# Patient Record
Sex: FEMALE | URBAN - METROPOLITAN AREA
[De-identification: names, ages, dates, MRNs, and addresses within clinical notes are randomized per-mention and may not be internally consistent; named-entity substitution may affect disease eponyms.]

---

## 2021-04-29 ENCOUNTER — OFFICE VISIT (OUTPATIENT)
Dept: SURGERY | Facility: CLINIC | Age: 46
End: 2021-04-29
Payer: COMMERCIAL

## 2021-04-29 DIAGNOSIS — D21.0 BENIGN NEOPLASM OF CONNECTIVE AND OTHER SOFT TISSUE OF HEAD, FACE AND NECK: Primary | ICD-10-CM

## 2021-04-29 PROCEDURE — 99204 OFFICE O/P NEW MOD 45 MIN: CPT | Performed by: PLASTIC SURGERY

## 2021-04-29 RX ORDER — HYDROCODONE BITARTRATE AND ACETAMINOPHEN 7.5; 325 MG/1; MG/1
1 TABLET ORAL
Qty: 10 TABLET | Refills: 0 | Status: SHIPPED | OUTPATIENT
Start: 2021-04-29

## 2021-04-29 NOTE — PROGRESS NOTES
Patient request for surgery signed by patient and witnessed and signed by RN. Prescription for Norco  and narcotic prescription electronically sent to pharmacy per Dr. Rin Brothers order and patient instructed to  prescription before surgery.    Pre

## 2021-04-29 NOTE — H&P
Ludwin Rodriguez is a 41year old female that presents with Patient presents with:  Cyst: forehead  .     REFERRED BY:  Misty Manuel    Pacemaker: No  Latex Allergy: no  Coumadin: No  Plavix: No  Other anticoagulants: No  Cardiac stents: No    HAND [facility] Normal  CARDIOVASCULAR: Regular rhythm, No murmurs  ABDOMEN: Inspection - Normal, No abdominal tenderness  NEURO: Memory intact  PSYCH: Oriented to person, place, time, and situation, Appropriate mood and affect      Integument Physical Exam:       Mid for

## 2021-12-18 ENCOUNTER — OFFICE VISIT (OUTPATIENT)
Dept: FAMILY MEDICINE CLINIC | Facility: CLINIC | Age: 46
End: 2021-12-18
Payer: COMMERCIAL

## 2021-12-18 VITALS — HEART RATE: 69 BPM

## 2021-12-18 DIAGNOSIS — Z20.822 EXPOSURE TO COVID-19 VIRUS: Primary | ICD-10-CM

## 2021-12-18 PROCEDURE — 99203 OFFICE O/P NEW LOW 30 MIN: CPT

## 2021-12-18 NOTE — PROGRESS NOTES
CHIEF COMPLAINT:   Patient presents with:  Covid-19 Test      HPI:   Leon Patrick is a 40year old female who presents for Covid 19 exposure a few days ago, from son.   At this time, is not experiencing upper respiratory symptoms, fever, or gastrointestin Sonia Schmidt is a 40year old female who presents with     ASSESSMENT: Exposure to covid-19 virus  (primary encounter diagnosis)    PLAN:   OTC Tylenol as needed. Reviewed symptom relief measures with patient if symptoms develop.      RT PCR - [facility] ordered patient indicates understanding of these issues and agrees to the plan.

## 2021-12-18 NOTE — PATIENT INSTRUCTIONS
• If you have any questions or concerns please contact our answering service at [phone] and we will return your phone call as soon as possible.    • Results for covid testing can vary from 1-2 days  • If you have not received results by 2 days please c

## 2023-11-16 ENCOUNTER — OFFICE VISIT (OUTPATIENT)
Dept: SURGERY | Facility: CLINIC | Age: 48
End: 2023-11-16

## 2023-11-16 DIAGNOSIS — D21.0 BENIGN NEOPLASM OF CONNECTIVE AND SOFT TISSUE OF HEAD: Primary | ICD-10-CM

## 2023-11-16 DIAGNOSIS — D21.0 BENIGN NEOPLASM OF CONNECTIVE AND OTHER SOFT TISSUE OF HEAD, FACE AND NECK: Primary | ICD-10-CM

## 2023-11-16 PROCEDURE — 99214 OFFICE O/P EST MOD 30 MIN: CPT | Performed by: PLASTIC SURGERY

## 2023-11-16 RX ORDER — RIBOFLAVIN (VITAMIN B2) 100 MG
100 TABLET ORAL DAILY
COMMUNITY

## 2023-11-16 RX ORDER — ZINC SULFATE 50(220)MG
220 CAPSULE ORAL DAILY
COMMUNITY

## 2023-11-16 RX ORDER — MULTIVIT-MIN/IRON FUM/FOLIC AC 7.5 MG-4
1 TABLET ORAL DAILY
COMMUNITY

## 2023-11-16 RX ORDER — HYDROCODONE BITARTRATE AND ACETAMINOPHEN 7.5; 325 MG/1; MG/1
1 TABLET ORAL
Qty: 10 TABLET | Refills: 0 | Status: SHIPPED | OUTPATIENT
Start: 2023-11-16

## 2023-11-16 NOTE — PROGRESS NOTES
Patient request for surgery signed by patient and witnessed and signed by RN. Prescription for Norco electronically sent to pharmacy per Dr. Nathaly Davidson order and patient instructed to  prescription before surgery. Pre-Surgical Instruction Handout given to and reviewed w/patient. All questions and concerns answered; pt verbalized an understanding of all pre-operative teaching. Patient instructed to call the office with any further questions and/or concerns. Patient escorted to surgery scheduling to schedule surgery and post-operative appointments.

## 2023-11-16 NOTE — H&P
Serge Son is a 53year old female that presents with   Chief Complaint   Patient presents with    Mass     Forehead   . REFERRED BY:  No ref. provider found    Pacemaker: No  Latex Allergy: no  Coumadin: No  Plavix: No  Other anticoagulants: No  Cardiac stents: No    HAND DOMINANCE:  Right  Profession:  nonprofit [employer]    RECONSTRUCTIVE HISTORY    SUN EXPOSURE   Current no   Past no   Sunburns no   Tanning salons current no   Tanning salons past no   Radiation treatments No     SKIN CANCER    Personal history of skin cancer: none    HAND     Previous hand injury (personal)   no      HPI:       50-year-old female with a mass of the forehead    5 years duration. Initially increased in size but size is stable    No pain          Review of Systems:   Constitutional: No change in appetite, chill/rigors, or fatigue  GI: No jaundice  Endocrine: No generalized weakness  Neurological: No aphasia, loss of consciousness, or seizures       Integumentary:     LESION 1:    Location  Forehead    Onset:   5 years    Pain:   no    Itching:   no    Bleeding:  no    Infection:  no    Size change?  increased:    Color change? No    Biopsy?:   No    Forehead with non-tender cyst, skin color without s/s of infection. Pt was seen for consultation 4/29/21 for same mass by Dr Liv Zacarias, surgery was recommended. Pt would like to schedule surgery for excision. PMH:     MEDICAL  History reviewed. No pertinent past medical history. SURGICAL  History reviewed. No pertinent surgical history. ALLERIGIES  No Known Allergies     MEDICATIONS  Current Outpatient Medications   Medication Sig Dispense Refill    Multiple Vitamins-Minerals (MULTI-VITAMIN/MINERALS) Oral Tab Take 1 tablet by mouth daily. Ascorbic Acid (VITAMIN C) 100 MG Oral Tab Take 1 tablet (100 mg total) by mouth daily. zinc sulfate 220 (50 Zn) MG Oral Cap Take 1 capsule (220 mg total) by mouth daily.           SOCIAL HISTORY  Social History     Socioeconomic History    Marital status: Unknown   Tobacco Use    Smoking status: Every Day   Substance and Sexual Activity    Alcohol use: Yes        FAMILY HISTORY  History reviewed. No pertinent family history. PHYSICAL EXAM:     CONSTITUTIONAL: Overall appearance - Normal  HEENT: Normocephalic  EYES: Conjunctiva - Right: Normal, Left: Normal; EOMI  EARS: Inspection - Right: Normal, Left: Normal  NECK/THYROID: Inspection - Normal, Palpation - Normal, Thyroid gland - Normal, No adenopathy  RESPIRATORY: Inspection - Normal, Effort - Normal  CARDIOVASCULAR: Regular rhythm, No murmurs  ABDOMEN: Inspection - Normal, No abdominal tenderness  NEURO: Memory intact  PSYCH: Oriented to person, place, time, and situation, Appropriate mood and affect      Integument Physical Exam:       2 cm submuscular lipoma of the mid forehead      ASSESSMENT/PLAN:     IMPRESSION:    Forehead submuscular lipoma        We had a long discussion. I explained to the patient the nature of this lesion / these lesions, as well as treatment options. EXCISION: This lesion should be excised. This will result in a permanent scar, which will be longer than the size of the lesion. Further excision may be necessary, if tumor is present microscopically on the pathology report. This may result in a longer scar. Recurrence may occur, which will require further surgery. TREATMENT:    Questions were answered and the patient wishes to proceed with treatment.     Excision  She may develop numbness superiorly within the scalp postoperatively          11/16/2023  Ananda Mejía MD    [facility] Data Reviewed.             +++++++++++++++++++++++++++++++++++++++++++++++++    MEDICAL DECISION MAKING    PROBLEMS      MODERATE    (number / complexity)          Undiagnosed new problem with uncertain prognosis    DATA         STRAIGHTFORWARD    (amount / complexity)           MANAGEMENT RISK  HIGH (complications/ morbidity)       Major surgery with risk factors                  MDM LEVEL    MODERATE

## 2023-11-16 NOTE — H&P (VIEW-ONLY)
Gale Curtis is a 53year old female that presents with   Chief Complaint   Patient presents with    Mass     Forehead   . REFERRED BY:  No ref. 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 Initially increased in size but size is stable    No pain          Review of Systems:   Constitutional: No change in appetite, chill/rigors, or fatigue  GI: No jaundice  Endocrine: No generalized weakness  Neurological: No aphasia, loss of consciousness, or seizures       Integumentary:     LESION 1:    Location  Forehead    Onset:   5 years    Pain:   no    Itching:   no    Bleeding:  no    Infection:  no    Size change?  increased:    Color change? No    Biopsy?:   No    Forehead with non-tender cyst, skin color without s/s of infection. Pt was seen for consultation 4/29/21 for same mass by Dr Maura Blake, surgery was recommended. Pt would like to schedule surgery for excision. PMH:     MEDICAL  History reviewed. No pertinent past medical history. SURGICAL  History reviewed. No pertinent surgical history. ALLERIGIES  No Known Allergies     MEDICATIONS  Current Outpatient Medications   Medication Sig Dispense Refill    Multiple Vitamins-Minerals (MULTI-VITAMIN/MINERALS) Oral Tab Take 1 tablet by mouth daily. Ascorbic Acid (VITAMIN C) 100 MG Oral Tab Take 1 tablet (100 mg total) by mouth daily. zinc sulfate 220 (50 Zn) MG Oral Cap Take 1 capsule (220 mg total) by mouth daily.           SOCIAL HISTORY  Social History     Socioeconomic History    Marital status: Unknown   Tobacco Use    Smoking status: Every Day   Substance and Sexual Activity    Alcohol use: Yes        FAMILY HISTORY  History reviewed. No pertinent family history. PHYSICAL EXAM:     CONSTITUTIONAL: Overall appearance - Normal  HEENT: Normocephalic  EYES: Conjunctiva - Right: Normal, Left: Normal; EOMI  EARS: Inspection - Right: Normal, Left: Normal  NECK/THYROID: Inspection - Normal, Palpation - Normal, Thyroid gland - Normal, No adenopathy  RESPIRATORY: Inspection - Normal, Effort - Normal  CARDIOVASCULAR: Regular rhythm, No murmurs  ABDOMEN: Inspection - Normal, No abdominal tenderness  NEURO: Memory intact  PSYCH: Oriented to person, place, time, and situation, Appropriate mood and affect      Integument Physical Exam:       2 cm submuscular lipoma of the mid forehead      ASSESSMENT/PLAN:     IMPRESSION:    Forehead submuscular lipoma        We had a long discussion. I explained to the patient the nature of this lesion / these lesions, as well as treatment options. EXCISION: This lesion should be excised. This will result in a permanent scar, which will be longer than the size of the lesion. Further excision may be necessary, if tumor is present microscopically on the pathology report. This may result in a longer scar. Recurrence may occur, which will require further surgery. TREATMENT:    Questions were answered and the patient wishes to proceed with treatment.     Excision  She may develop numbness superiorly within the scalp postoperatively          11/16/2023  Maggi Yen MD    [facility] Data Reviewed.             +++++++++++++++++++++++++++++++++++++++++++++++++    MEDICAL DECISION MAKING    PROBLEMS      MODERATE    (number / complexity)          Undiagnosed new problem with uncertain prognosis    DATA         STRAIGHTFORWARD    (amount / complexity)           MANAGEMENT RISK  HIGH (complications/ morbidity)       Major surgery with risk factors                  MDM LEVEL    MODERATE

## 2023-12-05 ENCOUNTER — HOSPITAL ENCOUNTER (OUTPATIENT)
Facility: HOSPITAL | Age: 48
Setting detail: HOSPITAL OUTPATIENT SURGERY
Discharge: HOME | End: 2023-12-05
Attending: PLASTIC SURGERY | Admitting: PLASTIC SURGERY
Payer: COMMERCIAL

## 2023-12-05 ENCOUNTER — ANESTHESIA (OUTPATIENT)
Dept: SURGERY | Facility: HOSPITAL | Age: 48
End: 2023-12-05
Payer: COMMERCIAL

## 2023-12-05 ENCOUNTER — HOSPITAL DOCUMENTATION (OUTPATIENT)
Dept: SURGERY | Facility: CLINIC | Age: 48
End: 2023-12-05

## 2023-12-05 ENCOUNTER — ANESTHESIA EVENT (OUTPATIENT)
Dept: SURGERY | Facility: HOSPITAL | Age: 48
End: 2023-12-05
Payer: COMMERCIAL

## 2023-12-05 VITALS
TEMPERATURE: 98 F | HEART RATE: 61 BPM | HEIGHT: 69 IN | BODY MASS INDEX: 43.4 KG/M2 | SYSTOLIC BLOOD PRESSURE: 132 MMHG | OXYGEN SATURATION: 100 % | RESPIRATION RATE: 15 BRPM | DIASTOLIC BLOOD PRESSURE: 86 MMHG | WEIGHT: 293 LBS

## 2023-12-05 DIAGNOSIS — D21.0 BENIGN NEOPLASM OF CONNECTIVE AND SOFT TISSUE OF HEAD: ICD-10-CM

## 2023-12-05 DIAGNOSIS — D21.0 BENIGN NEOPLASM OF CONNECTIVE AND OTHER SOFT TISSUE OF HEAD, FACE AND NECK: Primary | ICD-10-CM

## 2023-12-05 PROBLEM — Z04.9 OBSERVATION FOR SUSPECTED CONDITION: Status: ACTIVE | Noted: 2023-12-05

## 2023-12-05 LAB — B-HCG UR QL: NEGATIVE

## 2023-12-05 PROCEDURE — 0JB10ZZ EXCISION OF FACE SUBCUTANEOUS TISSUE AND FASCIA, OPEN APPROACH: ICD-10-PCS | Performed by: PLASTIC SURGERY

## 2023-12-05 PROCEDURE — 21013 EXC FACE TUM DEEP < 2 CM: CPT | Performed by: PLASTIC SURGERY

## 2023-12-05 RX ORDER — HYDROCODONE BITARTRATE AND ACETAMINOPHEN 7.5; 325 MG/1; MG/1
1 TABLET ORAL EVERY 4 HOURS PRN
Status: DISCONTINUED | OUTPATIENT
Start: 2023-12-05 | End: 2023-12-05

## 2023-12-05 RX ORDER — HYDROMORPHONE HYDROCHLORIDE 1 MG/ML
0.2 INJECTION, SOLUTION INTRAMUSCULAR; INTRAVENOUS; SUBCUTANEOUS EVERY 5 MIN PRN
Status: DISCONTINUED | OUTPATIENT
Start: 2023-12-05 | End: 2023-12-05

## 2023-12-05 RX ORDER — FAMOTIDINE 20 MG/1
20 TABLET, FILM COATED ORAL ONCE
Status: COMPLETED | OUTPATIENT
Start: 2023-12-05 | End: 2023-12-05

## 2023-12-05 RX ORDER — ROCURONIUM BROMIDE 10 MG/ML
INJECTION, SOLUTION INTRAVENOUS AS NEEDED
Status: DISCONTINUED | OUTPATIENT
Start: 2023-12-05 | End: 2023-12-05 | Stop reason: SURG

## 2023-12-05 RX ORDER — KETOROLAC TROMETHAMINE 30 MG/ML
INJECTION, SOLUTION INTRAMUSCULAR; INTRAVENOUS AS NEEDED
Status: DISCONTINUED | OUTPATIENT
Start: 2023-12-05 | End: 2023-12-05 | Stop reason: SURG

## 2023-12-05 RX ORDER — HYDROMORPHONE HYDROCHLORIDE 1 MG/ML
0.4 INJECTION, SOLUTION INTRAMUSCULAR; INTRAVENOUS; SUBCUTANEOUS EVERY 5 MIN PRN
Status: DISCONTINUED | OUTPATIENT
Start: 2023-12-05 | End: 2023-12-05

## 2023-12-05 RX ORDER — GLYCOPYRROLATE 0.2 MG/ML
INJECTION, SOLUTION INTRAMUSCULAR; INTRAVENOUS AS NEEDED
Status: DISCONTINUED | OUTPATIENT
Start: 2023-12-05 | End: 2023-12-05 | Stop reason: SURG

## 2023-12-05 RX ORDER — MORPHINE SULFATE 10 MG/ML
6 INJECTION, SOLUTION INTRAMUSCULAR; INTRAVENOUS EVERY 10 MIN PRN
Status: DISCONTINUED | OUTPATIENT
Start: 2023-12-05 | End: 2023-12-05

## 2023-12-05 RX ORDER — NEOSTIGMINE METHYLSULFATE 1 MG/ML
INJECTION, SOLUTION INTRAVENOUS AS NEEDED
Status: DISCONTINUED | OUTPATIENT
Start: 2023-12-05 | End: 2023-12-05 | Stop reason: SURG

## 2023-12-05 RX ORDER — DEXAMETHASONE SODIUM PHOSPHATE 4 MG/ML
VIAL (ML) INJECTION AS NEEDED
Status: DISCONTINUED | OUTPATIENT
Start: 2023-12-05 | End: 2023-12-05 | Stop reason: SURG

## 2023-12-05 RX ORDER — LIDOCAINE HYDROCHLORIDE 10 MG/ML
INJECTION, SOLUTION EPIDURAL; INFILTRATION; INTRACAUDAL; PERINEURAL AS NEEDED
Status: DISCONTINUED | OUTPATIENT
Start: 2023-12-05 | End: 2023-12-05 | Stop reason: SURG

## 2023-12-05 RX ORDER — FAMOTIDINE 10 MG/ML
20 INJECTION, SOLUTION INTRAVENOUS ONCE
Status: COMPLETED | OUTPATIENT
Start: 2023-12-05 | End: 2023-12-05

## 2023-12-05 RX ORDER — METOCLOPRAMIDE HYDROCHLORIDE 5 MG/ML
10 INJECTION INTRAMUSCULAR; INTRAVENOUS ONCE
Status: COMPLETED | OUTPATIENT
Start: 2023-12-05 | End: 2023-12-05

## 2023-12-05 RX ORDER — ONDANSETRON 2 MG/ML
4 INJECTION INTRAMUSCULAR; INTRAVENOUS EVERY 6 HOURS PRN
Status: DISCONTINUED | OUTPATIENT
Start: 2023-12-05 | End: 2023-12-05

## 2023-12-05 RX ORDER — SODIUM CHLORIDE, SODIUM LACTATE, POTASSIUM CHLORIDE, CALCIUM CHLORIDE 600; 310; 30; 20 MG/100ML; MG/100ML; MG/100ML; MG/100ML
INJECTION, SOLUTION INTRAVENOUS CONTINUOUS
Status: DISCONTINUED | OUTPATIENT
Start: 2023-12-05 | End: 2023-12-05

## 2023-12-05 RX ORDER — MORPHINE SULFATE 4 MG/ML
2 INJECTION, SOLUTION INTRAMUSCULAR; INTRAVENOUS EVERY 10 MIN PRN
Status: DISCONTINUED | OUTPATIENT
Start: 2023-12-05 | End: 2023-12-05

## 2023-12-05 RX ORDER — NALOXONE HYDROCHLORIDE 0.4 MG/ML
0.08 INJECTION, SOLUTION INTRAMUSCULAR; INTRAVENOUS; SUBCUTANEOUS AS NEEDED
Status: DISCONTINUED | OUTPATIENT
Start: 2023-12-05 | End: 2023-12-05

## 2023-12-05 RX ORDER — LIDOCAINE HYDROCHLORIDE AND EPINEPHRINE 5; 5 MG/ML; UG/ML
INJECTION, SOLUTION INFILTRATION; PERINEURAL AS NEEDED
Status: DISCONTINUED | OUTPATIENT
Start: 2023-12-05 | End: 2023-12-05 | Stop reason: HOSPADM

## 2023-12-05 RX ORDER — ONDANSETRON 2 MG/ML
INJECTION INTRAMUSCULAR; INTRAVENOUS AS NEEDED
Status: DISCONTINUED | OUTPATIENT
Start: 2023-12-05 | End: 2023-12-05 | Stop reason: SURG

## 2023-12-05 RX ORDER — METOCLOPRAMIDE 10 MG/1
10 TABLET ORAL ONCE
Status: COMPLETED | OUTPATIENT
Start: 2023-12-05 | End: 2023-12-05

## 2023-12-05 RX ORDER — MORPHINE SULFATE 4 MG/ML
4 INJECTION, SOLUTION INTRAMUSCULAR; INTRAVENOUS EVERY 10 MIN PRN
Status: DISCONTINUED | OUTPATIENT
Start: 2023-12-05 | End: 2023-12-05

## 2023-12-05 RX ORDER — ACETAMINOPHEN 500 MG
1000 TABLET ORAL ONCE
Status: COMPLETED | OUTPATIENT
Start: 2023-12-05 | End: 2023-12-05

## 2023-12-05 RX ORDER — PROCHLORPERAZINE EDISYLATE 5 MG/ML
5 INJECTION INTRAMUSCULAR; INTRAVENOUS EVERY 8 HOURS PRN
Status: DISCONTINUED | OUTPATIENT
Start: 2023-12-05 | End: 2023-12-05

## 2023-12-05 RX ORDER — HYDROMORPHONE HYDROCHLORIDE 1 MG/ML
0.6 INJECTION, SOLUTION INTRAMUSCULAR; INTRAVENOUS; SUBCUTANEOUS EVERY 5 MIN PRN
Status: DISCONTINUED | OUTPATIENT
Start: 2023-12-05 | End: 2023-12-05

## 2023-12-05 RX ADMIN — ROCURONIUM BROMIDE 10 MG: 10 INJECTION, SOLUTION INTRAVENOUS at 10:09:00

## 2023-12-05 RX ADMIN — SODIUM CHLORIDE, SODIUM LACTATE, POTASSIUM CHLORIDE, CALCIUM CHLORIDE: 600; 310; 30; 20 INJECTION, SOLUTION INTRAVENOUS at 10:25:00

## 2023-12-05 RX ADMIN — DEXAMETHASONE SODIUM PHOSPHATE 8 MG: 4 MG/ML VIAL (ML) INJECTION at 10:20:00

## 2023-12-05 RX ADMIN — KETOROLAC TROMETHAMINE 30 MG: 30 INJECTION, SOLUTION INTRAMUSCULAR; INTRAVENOUS at 10:58:00

## 2023-12-05 RX ADMIN — SODIUM CHLORIDE, SODIUM LACTATE, POTASSIUM CHLORIDE, CALCIUM CHLORIDE: 600; 310; 30; 20 INJECTION, SOLUTION INTRAVENOUS at 11:00:00

## 2023-12-05 RX ADMIN — GLYCOPYRROLATE 0.6 MG: 0.2 INJECTION, SOLUTION INTRAMUSCULAR; INTRAVENOUS at 10:59:00

## 2023-12-05 RX ADMIN — ONDANSETRON 4 MG: 2 INJECTION INTRAMUSCULAR; INTRAVENOUS at 10:42:00

## 2023-12-05 RX ADMIN — LIDOCAINE HYDROCHLORIDE 50 MG: 10 INJECTION, SOLUTION EPIDURAL; INFILTRATION; INTRACAUDAL; PERINEURAL at 10:09:00

## 2023-12-05 RX ADMIN — ROCURONIUM BROMIDE 30 MG: 10 INJECTION, SOLUTION INTRAVENOUS at 10:16:00

## 2023-12-05 RX ADMIN — NEOSTIGMINE METHYLSULFATE 4 MG: 1 INJECTION, SOLUTION INTRAVENOUS at 10:59:00

## 2023-12-05 NOTE — OPERATIVE REPORT
[facility]    PATIENT'S NAME: Asaf Flower   ATTENDING PHYSICIAN: Nj Godwin MD   OPERATING PHYSICIAN: Nj Godwin MD   PATIENT ACCOUNT#:   [de-identified]    LOCATION:  [address]  MEDICAL RECORD #:   [id number]       YOB: 1975  ADMISSION DATE:       12/05/2023      OPERATION DATE:  12/05/2023    OPERATIVE REPORT    PREOPERATIVE DIAGNOSIS:  Submuscular lipoma, forehead. POSTOPERATIVE DIAGNOSIS:  Submuscular lipoma, forehead, pending pathology report. PROCEDURE:  Excision of submuscular mass of forehead. INDICATIONS:  A 72-year-old female with a 5-year history of a mass of the forehead. She is admitted to the operating amphitheater for excision. FINDINGS:  A 2 cm firm, nontender mass beneath the frontalis muscle is present. OPERATIVE TECHNIQUE:  The patient was placed under general anesthesia. Operative site was infiltrated with 0.5% Marcaine with 1:200,000 epinephrine. The area was prepped and draped in the usual sterile fashion. A transverse incision paralleling relaxed skin tension lines was made. Skin flaps were elevated, exposing the fibers of the frontalis muscle. The frontalis muscle was incised longitudinally parallel to its fibers to gain access to the lipoma. The lipoma was dissected from the surrounding structures, carefully peeling it off the periosteum of the frontal bone. Meticulous hemostasis was achieved. Muscle was closed with figure-of-eight 4-0 Vicryls. Deep dermal sutures of 4-0 Vicryl were placed. Skin edges were reapproximated with 5-0 nylon. A Steri-Strip was applied. The patient tolerated the procedure well and left the operating suite in satisfactory condition. Dictated By Nj Godwin MD  d: 12/05/2023 11:05:15  t: 12/05/2023 14:07:09  Job [id number]  J/    cc: MD Jayson Tabares MD

## 2023-12-05 NOTE — BRIEF OP NOTE
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

## 2023-12-05 NOTE — ANESTHESIA POSTPROCEDURE EVALUATION
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     [address] [place] [address] AN Post Evaluation:   Patient Evaluated in PACU  Patient Participation: complete - patient participated  Level of Consciousness: awake and responsive to verbal stimuli  Pain Score: 1  Pain Management: adequate  Airway Patency:patent  Dental exam unchanged from preop  Yes    Cardiovascular Status: blood pressure returned to baseline and hemodynamically stable  Respiratory Status: spontaneous ventilation and nasal cannula  Postoperative Hydration euvolemic      Mima Arambula MD  12/5/2023 11:13 AM

## 2023-12-05 NOTE — INTERVAL H&P NOTE
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 I discussed with the patient and/or legal representative the potential benefits, risks and side effects of this procedure; the likelihood of the patient achieving goals; and potential problems that might occur during recuperation. I discussed reasonable alternatives to the procedure, including risks, benefits and side effects related to the alternatives and risks related to not receiving this procedure. We will proceed with procedure as planned.

## 2023-12-05 NOTE — ANESTHESIA PROCEDURE NOTES
Airway  Date/Time: 12/5/2023 10:11 AM  Urgency: elective    Airway not difficult    General Information and Staff    Patient location during procedure: OR  Anesthesiologist: Fred Sánchez MD  Performed: anesthesiologist   Performed by: Fred Sánchez MD  Authorized by: Fred Sánchez MD      Indications and Patient Condition  Indications for airway management: anesthesia  Spontaneous Ventilation: absent  Sedation level: deep  Preoxygenated: yes  Patient position: sniffing  Mask difficulty assessment: 0 - not attempted    Final Airway Details  Final airway type: endotracheal airway      Successful airway: ETT  Cuffed: yes   Successful intubation technique: Video laryngoscopy  Facilitating devices/methods: intubating stylet and cricoid pressure  Endotracheal tube insertion site: oral  Blade: GlideScope  Blade size: #4  ETT size (mm): 7.5    Cormack-Lehane Classification: grade I - full view of glottis  Placement verified by: capnometry   Cuff volume (mL): 8  Measured from: lips  ETT to lips (cm): 22  Number of attempts at approach: 1  Number of other approaches attempted: 0

## 2023-12-06 ENCOUNTER — TELEPHONE (OUTPATIENT)
Dept: SURGERY | Facility: CLINIC | Age: 48
End: 2023-12-06

## 2023-12-06 NOTE — TELEPHONE ENCOUNTER
Spoke w/the pt and she states that she is not having pain at this time, has not taken analgesics today and denies s/s of infection. Pt states swelling noted to forehead surgical site which is expected. Pt reminded to keep steri-strips in place and if becomes loose can apply additional steri-strips. Pt reminded of RN appt for suture removal on 12/8/23 at 10am.   Pt verbalized an understanding. Pt instructed to call the office w/any other questions and/or concerns. Dr. Lavell Burden notified.

## 2023-12-06 NOTE — TELEPHONE ENCOUNTER
Spoke w/ patient. Patient told per Dr. Niharika Beck pathology results from surgery,  lipoma a benign fatty tumor. Patient Verbalized understanding. Patient Instructed to call the office with any questions and/or concerns. Dr. Niharika Beck notified.

## 2023-12-08 ENCOUNTER — NURSE ONLY (OUTPATIENT)
Dept: SURGERY | Facility: CLINIC | Age: 48
End: 2023-12-08

## 2023-12-08 DIAGNOSIS — D21.0 BENIGN NEOPLASM OF CONNECTIVE AND OTHER SOFT TISSUE OF HEAD, FACE AND NECK: ICD-10-CM

## 2023-12-08 DIAGNOSIS — Z48.02 VISIT FOR SUTURE REMOVAL: Primary | ICD-10-CM

## 2023-12-08 NOTE — PROGRESS NOTES
Surgery 1: Forehead submuscular mass  - Date: 12/05/23  - Days Since: 3    Patient here for suture removal to forehead. Patient identified with 2 identifiers and orders verified. Patient denies complaints of pain, use of analgesics, and s/s infection. Patient presents w/steri-strip C/D/I, removed. Running sutures C/D/I. Incision appears to be healing well, edges well approximated. Running sutures removed without difficulty and patient tolerated procedure well. Site cleansed with soap and water and new steri-strip applied. Patient instructed to carefully remove steri-strip on 12/12/23 and begin Eucerin massages. Eucerin massage demonstrated to pt and \"After Skin Surgery\" pamphlet given. Patient instructed on importance of sun block use for the first year after surgery. Patient verbalized an understanding of teaching. Patient instructed to call the office with any further questions and/or concerns. Pt discharged. Dr. Mimi Clinton notified. RN visit for SR, POD 3. Denies pain. Forehead incision healing well, no s/s infection. Steri-strip until 12/12/23, then begin Eucerin. Discharged.

## 2024-01-08 ENCOUNTER — TELEPHONE (OUTPATIENT)
Dept: SURGERY | Facility: CLINIC | Age: 49
End: 2024-01-08

## 2024-01-08 NOTE — TELEPHONE ENCOUNTER
Sent pt message via [employer] that Dr Russ reviewed her pictures and given pt update. Per Dr Russ:    \"Probably a little fluid accumulation.  It will reabsorb.  Nothing to worry about.\"

## 2024-01-08 NOTE — TELEPHONE ENCOUNTER
Spoke w/ pt.  Pt had forehead submuscular lipoma excision on 12/5/23.  Pt had SR on 12/8 and started Eucerin massage on 12/12/23 and has followed Eucerin instructions.  Pt called w/ concerns for an increasing in size, very firm \"lump\" along the scar. Pt reports that the lump is reddish/purple in color.  Pt denies pain and any drainage.  Pt will send pictures through [employer] for Dr Russ to review and will call pt back.

## (undated) DEVICE — DRAPE SRG 50X36IN HD TRBN STRL

## (undated) DEVICE — SUTURE PERMA  SZ 4-0 L18IN NONABSORBABLE

## (undated) DEVICE — UNDYED BRAIDED (POLYGLACTIN 910), SYNTHETIC ABSORBABLE SUTURE: Brand: COATED VICRYL

## (undated) DEVICE — OUTPATIENT: Brand: MEDLINE INDUSTRIES, INC.

## (undated) DEVICE — GAMMEX® PI HYBRID SIZE 7, STERILE POWDER-FREE SURGICAL GLOVE, POLYISOPRENE AND NEOPRENE BLEND: Brand: GAMMEX

## (undated) DEVICE — Device

## (undated) DEVICE — E-Z CLEAN, NON-STICK, PTFE COATED, ELECTROSURGICAL BLADE ELECTRODE, 2.75 INCH (7 CM): Brand: MEGADYNE

## (undated) DEVICE — SOLUTION IRRIG 1000ML 0.9% NACL USP BTL

## (undated) DEVICE — 3M™ TEGADERM™ TRANSPARENT FILM DRESSING, 1626W, 4 IN X 4-3/4 IN (10 CM X 12 CM), 50 EACH/CARTON, 4 CARTON/CASE: Brand: 3M™ TEGADERM™

## (undated) NOTE — LETTER
21      Patient: Stella Horta  : 1975 Visit date: 2021    Dear Daev Michael,      I examined your patient in consultation today. She has an enlarging submuscular lipoma of the forehead. We will plan on excision.     Thank